# Patient Record
Sex: MALE | Race: AMERICAN INDIAN OR ALASKA NATIVE
[De-identification: names, ages, dates, MRNs, and addresses within clinical notes are randomized per-mention and may not be internally consistent; named-entity substitution may affect disease eponyms.]

---

## 2017-08-10 ENCOUNTER — HOSPITAL ENCOUNTER (INPATIENT)
Dept: HOSPITAL 5 - ED | Age: 67
LOS: 2 days | Discharge: HOME | DRG: 379 | End: 2017-08-12
Attending: INTERNAL MEDICINE | Admitting: INTERNAL MEDICINE
Payer: MEDICARE

## 2017-08-10 DIAGNOSIS — K29.70: ICD-10-CM

## 2017-08-10 DIAGNOSIS — K26.9: ICD-10-CM

## 2017-08-10 DIAGNOSIS — I10: ICD-10-CM

## 2017-08-10 DIAGNOSIS — R00.1: ICD-10-CM

## 2017-08-10 DIAGNOSIS — K22.70: ICD-10-CM

## 2017-08-10 DIAGNOSIS — K21.9: ICD-10-CM

## 2017-08-10 DIAGNOSIS — F17.200: ICD-10-CM

## 2017-08-10 DIAGNOSIS — K44.9: ICD-10-CM

## 2017-08-10 DIAGNOSIS — K62.5: Primary | ICD-10-CM

## 2017-08-10 DIAGNOSIS — K29.80: ICD-10-CM

## 2017-08-10 LAB
ALBUMIN SERPL-MCNC: 4.1 G/DL (ref 3.9–5)
ALBUMIN/GLOB SERPL: 1.4 %
ALP SERPL-CCNC: 43 UNITS/L (ref 35–129)
ALT SERPL-CCNC: 13 UNITS/L (ref 7–56)
ANION GAP SERPL CALC-SCNC: 15 MMOL/L
APTT BLD: 31 SEC. (ref 24.2–36.6)
BACTERIA #/AREA URNS HPF: (no result) /HPF
BASOPHILS NFR BLD AUTO: 0.6 % (ref 0–1.8)
BILIRUB SERPL-MCNC: 0.3 MG/DL (ref 0.1–1.2)
BILIRUB UR QL STRIP: (no result)
BLOOD UR QL VISUAL: (no result)
BUN SERPL-MCNC: 14 MG/DL (ref 9–20)
BUN/CREAT SERPL: 14 %
CALCIUM SERPL-MCNC: 9.3 MG/DL (ref 8.4–10.2)
CHLORIDE SERPL-SCNC: 103.4 MMOL/L (ref 98–107)
CO2 SERPL-SCNC: 25 MMOL/L (ref 22–30)
EOSINOPHIL NFR BLD AUTO: 3.3 % (ref 0–4.3)
GLUCOSE SERPL-MCNC: 96 MG/DL (ref 75–100)
HCT VFR BLD CALC: 39.6 % (ref 35.5–45.6)
HCT VFR BLD CALC: 41.1 % (ref 35.5–45.6)
HGB BLD-MCNC: 13 GM/DL (ref 11.8–15.2)
HGB BLD-MCNC: 13.4 GM/DL (ref 11.8–15.2)
INR PPP: 1.13 (ref 0.87–1.13)
KETONES UR STRIP-MCNC: (no result) MG/DL
LEUKOCYTE ESTERASE UR QL STRIP: (no result)
LIPASE SERPL-CCNC: 24 UNITS/L (ref 13–60)
MCH RBC QN AUTO: 28 PG (ref 28–32)
MCHC RBC AUTO-ENTMCNC: 33 % (ref 32–34)
MCV RBC AUTO: 86 FL (ref 84–94)
MUCOUS THREADS #/AREA URNS HPF: (no result) /HPF
NITRITE UR QL STRIP: (no result)
PH UR STRIP: 5 [PH] (ref 5–7)
PLATELET # BLD: 357 K/MM3 (ref 140–440)
POTASSIUM SERPL-SCNC: 4.3 MMOL/L (ref 3.6–5)
PROT SERPL-MCNC: 7.1 G/DL (ref 6.3–8.2)
PROT UR STRIP-MCNC: (no result) MG/DL
RBC # BLD AUTO: 4.78 M/MM3 (ref 3.65–5.03)
RBC #/AREA URNS HPF: 4 /HPF (ref 0–6)
SODIUM SERPL-SCNC: 139 MMOL/L (ref 137–145)
UROBILINOGEN UR-MCNC: < 2 MG/DL (ref ?–2)
WBC # BLD AUTO: 6.9 K/MM3 (ref 4.5–11)
WBC #/AREA URNS HPF: < 1 /HPF (ref 0–6)

## 2017-08-10 PROCEDURE — 93005 ELECTROCARDIOGRAM TRACING: CPT

## 2017-08-10 PROCEDURE — 36415 COLL VENOUS BLD VENIPUNCTURE: CPT

## 2017-08-10 PROCEDURE — 84439 ASSAY OF FREE THYROXINE: CPT

## 2017-08-10 PROCEDURE — 80053 COMPREHEN METABOLIC PANEL: CPT

## 2017-08-10 PROCEDURE — 85014 HEMATOCRIT: CPT

## 2017-08-10 PROCEDURE — 85018 HEMOGLOBIN: CPT

## 2017-08-10 PROCEDURE — 85025 COMPLETE CBC W/AUTO DIFF WBC: CPT

## 2017-08-10 PROCEDURE — 82962 GLUCOSE BLOOD TEST: CPT

## 2017-08-10 PROCEDURE — 74177 CT ABD & PELVIS W/CONTRAST: CPT

## 2017-08-10 PROCEDURE — 83690 ASSAY OF LIPASE: CPT

## 2017-08-10 PROCEDURE — 84443 ASSAY THYROID STIM HORMONE: CPT

## 2017-08-10 PROCEDURE — 93010 ELECTROCARDIOGRAM REPORT: CPT

## 2017-08-10 PROCEDURE — 85610 PROTHROMBIN TIME: CPT

## 2017-08-10 PROCEDURE — 85730 THROMBOPLASTIN TIME PARTIAL: CPT

## 2017-08-10 PROCEDURE — 81001 URINALYSIS AUTO W/SCOPE: CPT

## 2017-08-10 PROCEDURE — 96375 TX/PRO/DX INJ NEW DRUG ADDON: CPT

## 2017-08-10 PROCEDURE — 96376 TX/PRO/DX INJ SAME DRUG ADON: CPT

## 2017-08-10 PROCEDURE — 96374 THER/PROPH/DIAG INJ IV PUSH: CPT

## 2017-08-10 PROCEDURE — 84484 ASSAY OF TROPONIN QUANT: CPT

## 2017-08-10 PROCEDURE — C9113 INJ PANTOPRAZOLE SODIUM, VIA: HCPCS

## 2017-08-10 RX ADMIN — DEXTROSE AND SODIUM CHLORIDE SCH MLS/HR: 5; .9 INJECTION, SOLUTION INTRAVENOUS at 22:00

## 2017-08-10 NOTE — HISTORY AND PHYSICAL REPORT
History of Present Illness


Date of examination: 08/10/17


Date of admission: 


08/10/17 17:57





Chief complaint: 





BRBPR  1 day


History of present illness: 





HPI: 


This is a 66-year-old -American male presents to the emergency 

department with complaint of rectal bleeding and/or GI bleed.  The patient says 

that he is having some mild lower abdominal discomfort over the past 2 weeks.  

He recently went to see his PCP, Dr. Maria, gave him something for pain.  

However the patient woke up this morning and had a bowel movement with bright 

red blood and then another bowel movement shortly afterwards that appeared to 

be more like dark tarry stools.  He went back to see the primary care physician 

who allegedly did a sigmoid scope and saw diverticular hemorrhage.  The patient 

has a history of this in the past and has required 2 admissions in the past for 

these symptoms.  He denies any nausea, vomiting, fever but he does have some 

mild lower abdominal pain with radiation towards the back.  He denies any 

numbness or paresthesias or any neurological deficits.  No recent travel or 

sick contacts at home.  He also has a history of hypertension.





- Past Medical History


Hx Hypertension: Yes


Hx Congestive Heart Failure: No


Hx GERD: Yes


Hx Asthma: No


Hx COPD: No


Additional medical history: DIVERTICULOSIS OF LARGE INTESTINE WITH HEMORRHAGE





- Surgical History


Past Surgical History?: No





- Social History


Smoking Status: Current Every Day Smoker


Substance Use Type: Alcohol, Prescribed





Fam HX HTN





- Medications


Home Medications: 


 Home Medications











 Medication  Instructions  Recorded  Confirmed  Last Taken  Type


 


Lisinopril [Zestril TAB] 20 mg PO QDAY 09/27/15 08/10/17 08/09/17 History


 


Sildenafil Citrate [Viagra] 25 mg PO DAILY 08/10/17 08/10/17 07/27/17 History














ED Review of Systems


ROS: 


Stated complaint: ABD PAIN


Other details as noted in HPI





Comment: All other systems reviewed and negative


Constitutional: denies: chills, fever


Eyes: denies: eye pain, eye discharge, vision change


ENT: denies: ear pain, throat pain


Respiratory: denies: cough, shortness of breath, wheezing


Cardiovascular: denies: chest pain, palpitations


Gastrointestinal: abdominal pain, other (GI bleed).  denies: nausea, vomiting


Genitourinary: denies: urgency, dysuria


Musculoskeletal: denies: joint swelling, arthralgia


Skin: denies: rash, lesions


Neurological: denies: headache, weakness, paresthesias








Past History


Past Medical History: hypertension





Medications and Allergies


 Allergies











Allergy/AdvReac Type Severity Reaction Status Date / Time


 


No Known Allergies Allergy   Verified 08/10/17 09:48











 Home Medications











 Medication  Instructions  Recorded  Confirmed  Last Taken  Type


 


Lisinopril [Zestril TAB] 20 mg PO QDAY 09/27/15 08/10/17 08/09/17 History


 


Sildenafil Citrate [Viagra] 25 mg PO DAILY 08/10/17 08/10/17 07/27/17 History














Exam





- Physical Exam


Narrative exam: 





Lying comfortably





- Constitutional


Vitals: 


 











Temp Pulse Resp BP Pulse Ox


 


 97.7 F   48 L  10 L  149/67   98 


 


 08/10/17 13:19  08/10/17 18:30  08/10/17 18:30  08/10/17 18:30  08/10/17 18:30











General appearance: Present: no acute distress, well-nourished





- EENT


Eyes: Present: PERRL


ENT: hearing intact, clear oral mucosa





- Neck


Neck: Present: supple, normal ROM





- Respiratory


Respiratory effort: normal


Respiratory: bilateral: CTA





- Cardiovascular


Heart rate: 41


Rhythm: regular


Heart Sounds: Present: S1 & S2.  Absent: rub, click





- Extremities


Extremities: no ischemia, pulses intact, pulses symmetrical, No edema


Peripheral Pulses: within normal limits





- Abdominal


General gastrointestinal: Present: soft, non-tender, non-distended, normal 

bowel sounds


Male genitourinary: Present: normal





- Integumentary


Integumentary: Present: clear, warm, dry





- Musculoskeletal


Musculoskeletal: gait normal, strength equal bilaterally





- Psychiatric


Psychiatric: appropriate mood/affect, intact judgment & insight





- Neurologic


Neurologic: CNII-XII intact, moves all extremities





- Allied Health


Allied health notes reviewed: nursing, case management





Results





- Labs


CBC & Chem 7: 


 08/11/17 03:40





 08/11/17 03:40


Labs: 


 Laboratory Last Values











WBC  6.9 K/mm3 (4.5-11.0)   08/10/17  10:04    


 


RBC  4.78 M/mm3 (3.65-5.03)   08/10/17  10:04    


 


Hgb  13.4 gm/dl (11.8-15.2)   08/10/17  10:04    


 


Hct  41.1 % (35.5-45.6)   08/10/17  10:04    


 


MCV  86 fl (84-94)   08/10/17  10:04    


 


MCH  28 pg (28-32)   08/10/17  10:04    


 


MCHC  33 % (32-34)   08/10/17  10:04    


 


RDW  14.4 % (13.2-15.2)   08/10/17  10:04    


 


Plt Count  357 K/mm3 (140-440)   08/10/17  10:04    


 


Lymph % (Auto)  33.1 % (13.4-35.0)   08/10/17  10:04    


 


Mono % (Auto)  9.8 % (0.0-7.3)  H  08/10/17  10:04    


 


Eos % (Auto)  3.3 % (0.0-4.3)   08/10/17  10:04    


 


Baso % (Auto)  0.6 % (0.0-1.8)   08/10/17  10:04    


 


Lymph #  2.3 K/mm3 (1.2-5.4)   08/10/17  10:04    


 


Mono #  0.7 K/mm3 (0.0-0.8)   08/10/17  10:04    


 


Eos #  0.2 K/mm3 (0.0-0.4)   08/10/17  10:04    


 


Baso #  0.0 K/mm3 (0.0-0.1)   08/10/17  10:04    


 


Seg Neutrophils %  53.2 % (40.0-70.0)   08/10/17  10:04    


 


Seg Neutrophils #  3.6 K/mm3 (1.8-7.7)   08/10/17  10:04    


 


PT  14.4 Sec. (12.2-14.9)   08/10/17  14:39    


 


INR  1.13  (0.87-1.13)   08/10/17  14:39    


 


APTT  31.0 Sec. (24.2-36.6)   08/10/17  14:39    


 


Sodium  139 mmol/L (137-145)   08/10/17  10:04    


 


Potassium  4.3 mmol/L (3.6-5.0)   08/10/17  10:04    


 


Chloride  103.4 mmol/L ()   08/10/17  10:04    


 


Carbon Dioxide  25 mmol/L (22-30)   08/10/17  10:04    


 


Anion Gap  15 mmol/L  08/10/17  10:04    


 


BUN  14 mg/dL (9-20)   08/10/17  10:04    


 


Creatinine  1.0 mg/dL (0.8-1.5)   08/10/17  10:04    


 


Estimated GFR  > 60 ml/min  08/10/17  10:04    


 


BUN/Creatinine Ratio  14.00 %  08/10/17  10:04    


 


Glucose  96 mg/dL ()   08/10/17  10:04    


 


Calcium  9.3 mg/dL (8.4-10.2)   08/10/17  10:04    


 


Total Bilirubin  0.30 mg/dL (0.1-1.2)   08/10/17  10:04    


 


AST  13 units/L (5-40)   08/10/17  10:04    


 


ALT  13 units/L (7-56)   08/10/17  10:04    


 


Alkaline Phosphatase  43 units/L ()   08/10/17  10:04    


 


Troponin T  < 0.010 ng/mL (0.00-0.029)   08/10/17  14:39    


 


Total Protein  7.1 g/dL (6.3-8.2)   08/10/17  10:04    


 


Albumin  4.1 g/dL (3.9-5)   08/10/17  10:04    


 


Albumin/Globulin Ratio  1.4 %  08/10/17  10:04    


 


Lipase  24 units/L (13-60)   08/10/17  10:04    


 


Urine Color  Straw  (Yellow)   08/10/17  10:12    


 


Urine Turbidity  Clear  (Clear)   08/10/17  10:12    


 


Urine pH  5.0  (5.0-7.0)   08/10/17  10:12    


 


Ur Specific Gravity  1.011  (1.003-1.030)   08/10/17  10:12    


 


Urine Protein  <15 mg/dl mg/dL (Negative)   08/10/17  10:12    


 


Urine Glucose (UA)  Neg mg/dL (Negative)   08/10/17  10:12    


 


Urine Ketones  Neg mg/dL (Negative)   08/10/17  10:12    


 


Urine Blood  Neg  (Negative)   08/10/17  10:12    


 


Urine Nitrite  Neg  (Negative)   08/10/17  10:12    


 


Urine Bilirubin  Neg  (Negative)   08/10/17  10:12    


 


Urine Urobilinogen  < 2.0 mg/dL (<2.0)   08/10/17  10:12    


 


Ur Leukocyte Esterase  Neg  (Negative)   08/10/17  10:12    


 


Urine WBC (Auto)  < 1.0 /HPF (0.0-6.0)   08/10/17  10:12    


 


Urine RBC (Auto)  4.0 /HPF (0.0-6.0)   08/10/17  10:12    


 


U Epithel Cells (Auto)  < 1.0 /HPF (0-13.0)   08/10/17  10:12    


 


Urine Bacteria (Auto)  1+ /HPF (Negative)   08/10/17  10:12    


 


Urine Mucus  Few /HPF  08/10/17  10:12    








 Short CBC











  08/10/17 08/10/17 08/11/17 Range/Units





  10:04 22:07 03:40 


 


WBC  6.9   9.5  (4.5-11.0)  K/mm3


 


Hgb  13.4  13.0  12.9  (11.8-15.2)  gm/dl


 


Hct  41.1  39.6  39.3  (35.5-45.6)  %


 


Plt Count  357   357  (140-440)  K/mm3








 BMP











  08/10/17 08/11/17





  10:04 03:40


 


Sodium  139  140


 


Potassium  4.3  3.8


 


Chloride  103.4  102.5


 


Carbon Dioxide  25  22


 


BUN  14  12


 


Creatinine  1.0  1.0


 


Glucose  96  104 H


 


Calcium  9.3  9.0








 Cardiac Enzymes











  08/10/17 Range/Units





  14:39 


 


Troponin T  < 0.010  (0.00-0.029)  ng/mL








 Liver Function











  08/10/17 08/11/17 Range/Units





  10:04 03:40 


 


Total Bilirubin  0.30  0.50  (0.1-1.2)  mg/dL


 


AST  13  12  (5-40)  units/L


 


ALT  13  12  (7-56)  units/L


 


Alkaline Phosphatase  43  39  ()  units/L


 


Albumin  4.1  3.9  (3.9-5)  g/dL








 Urine











  08/10/17 Range/Units





  10:12 


 


Urine Color  Straw  (Yellow)  


 


Urine pH  5.0  (5.0-7.0)  


 


Ur Specific Gravity  1.011  (1.003-1.030)  


 


Urine Protein  <15 mg/dl  (Negative)  mg/dL


 


Urine Glucose (UA)  Neg  (Negative)  mg/dL














- Imaging and Cardiology


EKG: report reviewed (41/min Marked sinus Bradycardia)





Assessment and Plan


Advance Directives: Yes (Full code)


VTE prophylaxis?: Chemical


Plan of care discussed with patient/family: Yes





- Patient Problems


(1) Lower GI bleed


Current Visit: Yes   Status: Acute   


Plan to address problem: 


Probably Diverticular.Had similar episode s once in 2015 and 2016 each.Doesn't 

remember the diagnosis.


GI consult requested.


Transfuse PRBC if necessary.








(2) Bradycardia


Current Visit: Yes   Status: Chronic   


Plan to address problem: 


Asymptomatic


Probably vasovagal.Will consult cardiology if persistent and symptomatic








(3) Hypertension


Current Visit: Yes   Status: Chronic   


Qualifiers: 


   Hypertension type: essential hypertension   Qualified Code(s): I10 - 

Essential (primary) hypertension   


Plan to address problem: 


Patient initiated on Catapress patch








(4) GERD (gastroesophageal reflux disease)


Current Visit: Yes   Status: Acute   


Qualifiers: 


   Esophagitis presence: without esophagitis   Qualified Code(s): K21.9 - Gastro

-esophageal reflux disease without esophagitis   


Plan to address problem: 


IV Famotidine for now








(5) Nicotine dependence


Current Visit: Yes   Status: Chronic   


Qualifiers: 


   Nicotine product type: cigarettes   Substance use status: S 


Plan to address problem: 


Nicoderm patch initiated








(6) DVT prophylaxis


Current Visit: No   Status: Acute   


Plan to address problem: 


Only SCD's

## 2017-08-10 NOTE — EMERGENCY DEPARTMENT REPORT
HPI





- General


Chief Complaint: GI Bleed


Time Seen by Provider: 08/10/17 10:44





- HPI


HPI: 


This is a 66-year-old -American male presents to the emergency 

department with complaint of rectal bleeding and/or GI bleed.  The patient says 

that he is having some mild lower abdominal discomfort over the past 2 weeks.  

He recently went to see his PCP, Dr. Maria, gave him something for pain.  

However the patient woke up this morning and had a bowel movement with bright 

red blood and then another bowel movement shortly afterwards that appeared to 

be more like dark tarry stools.  He went back to see the primary care physician 

who allegedly did a sigmoid scope and saw diverticular hemorrhage.  The patient 

has a history of this in the past and has required 2 admissions in the past for 

these symptoms.  He denies any nausea, vomiting, fever but he does have some 

mild lower abdominal pain with radiation towards the back.  He denies any 

numbness or paresthesias or any neurological deficits.  No recent travel or 

sick contacts at home.  He also has a history of hypertension.








ED Past Medical Hx





- Past Medical History


Hx Hypertension: Yes


Hx Congestive Heart Failure: No


Hx GERD: Yes


Hx Asthma: No


Hx COPD: No


Additional medical history: DIVERTICULOSIS OF LARGE INTESTINE WITH HEMORRHAGE





- Surgical History


Past Surgical History?: No





- Social History


Smoking Status: Current Every Day Smoker


Substance Use Type: Alcohol, Prescribed





- Medications


Home Medications: 


 Home Medications











 Medication  Instructions  Recorded  Confirmed  Last Taken  Type


 


Lisinopril [Zestril TAB] 20 mg PO QDAY 09/27/15 08/10/17 08/09/17 History


 


Sildenafil Citrate [Viagra] 25 mg PO DAILY 08/10/17 08/10/17 07/27/17 History














ED Review of Systems


ROS: 


Stated complaint: ABD PAIN


Other details as noted in HPI





Comment: All other systems reviewed and negative


Constitutional: denies: chills, fever


Eyes: denies: eye pain, eye discharge, vision change


ENT: denies: ear pain, throat pain


Respiratory: denies: cough, shortness of breath, wheezing


Cardiovascular: denies: chest pain, palpitations


Gastrointestinal: abdominal pain, other (GI bleed).  denies: nausea, vomiting


Genitourinary: denies: urgency, dysuria


Musculoskeletal: denies: joint swelling, arthralgia


Skin: denies: rash, lesions


Neurological: denies: headache, weakness, paresthesias





Physical Exam





- Physical Exam


Vital Signs: 


 Vital Signs











  08/10/17





  09:41


 


Temperature 97.3 F L


 


Pulse Rate 51 L


 


Respiratory 18





Rate 


 


Blood Pressure 162/96


 


O2 Sat by Pulse 97





Oximetry 











Physical Exam: 


GENERAL: The patient is well-developed well-nourished.


HEENT: Normocephalic.  Atraumatic.  Extraocular motions are intact.  Patient 

has moist mucous membranes.  Pupils equal reactive to light bilaterally.


NECK: Supple.  Trachea is midline.


CHEST/LUNGS: Clear to auscultation.  There is no respiratory distress noted.


HEART/CARDIOVASCULAR: Regular.  There is mild bradycardia.  There is no gallop 

rub or murmur.


ABDOMEN: Abdomen is soft, nontender.  Patient has normal bowel sounds.  There 

is no abdominal distention.


SKIN: Skin is warm and dry.


NEURO: The patient is awake, alert, and oriented.  The patient is cooperative.  

The patient has no focal neurologic deficits.  The patient has normal speech.


MUSCULOSKELETAL: There is no tenderness or deformity.  There is no limitation 

range of motion.  There is no evidence of acute injury.


Rectal: Deferred








ED Course


 Vital Signs











  08/10/17





  09:41


 


Temperature 97.3 F L


 


Pulse Rate 51 L


 


Respiratory 18





Rate 


 


Blood Pressure 162/96


 


O2 Sat by Pulse 97





Oximetry 














ED Medical Decision Making





- Lab Data


Result diagrams: 


 08/10/17 10:04





 08/10/17 10:04





- EKG Data


-: EKG Interpreted by Me


EKG shows normal: sinus rhythm, axis, intervals, QRS complexes, ST-T waves


Rate: bradycardia (41 bpm)





- EKG Data


When compared to previous EKG there are: previous EKG unavailable


Interpretation: other (marked sinus bradycardia)





- Radiology Data


Radiology results: report reviewed


CT of the abdomen and pelvis with IV contrast was read by radiology as 

essentially a normal examination.








- Medical Decision Making





66-year-old male presents emergency Department with a few weeks of some 

abdominal discomfort but really came in today because of rectal bleeding that 

appeared to be a diverticular hemorrhage at his PCPs office.  Most the labs 

unremarkable.  CT does not show any signs of diverticulitis, malignancy or any 

other acute process.  Patient's only does not appear to need any type of 

transfusion at this time.  However while the patient is in the emergency 

department he has had significant bradycardia going down into the 30s, all 

while awake, and is not on any type of beta blocker or calcium channel blocker 

or anything for rate control.  This reason the patient will be admitted to the 

hospital for further evaluation and has been accepted for admission by the 

hospitalist, Dr. Bustillos.





- Differential Diagnosis


diverticulosis, diverticulitis, malignancy, colitis


Critical Care Time: No


Critical care attestation.: 


If time is entered above; I have spent that time in minutes in the direct care 

of this critically ill patient, excluding procedure time.








ED Disposition


Clinical Impression: 


 Rectal bleeding, Bradycardia





Hypertension


Qualifiers:


 Hypertension type: essential hypertension Qualified Code(s): I10 - Essential (

primary) hypertension





Disposition: DC-09 OP ADMIT IP TO THIS HOSP


Is pt being admited?: Yes


Does the pt Need Aspirin: No


Condition: Stable


Instructions:  Hypertension (ED)


Referrals: 


PRIMARY CARE,MD [Primary Care Provider] - 3-5 Days


Forms:  Accompanied Note


Time of Disposition: 15:32

## 2017-08-10 NOTE — CAT SCAN REPORT
CT scan of abdomen and pelvis with IV contrast:



History: Abdominal pain.



Findings:



Normal lung bases. No pleural or pericardial effusion.



Normal liver spleen pancreas and gallbladder.



Normal adrenals kidney parenchyma and bladder. No free intraperitoneal 

fluid or air. Prostate measures 3.3 x 4.1 cm.



Gaseous colon with small volume stool in colon. No evidence of 

appendicitis or diverticulitis.



Impression:



Essentially negative CT scan of abdomen and CT scan of pelvis.

## 2017-08-11 LAB
ALBUMIN SERPL-MCNC: 3.9 G/DL (ref 3.9–5)
ALBUMIN/GLOB SERPL: 1.4 %
ALP SERPL-CCNC: 39 UNITS/L (ref 35–129)
ALT SERPL-CCNC: 12 UNITS/L (ref 7–56)
ANION GAP SERPL CALC-SCNC: 19 MMOL/L
BASOPHILS NFR BLD AUTO: 0.6 % (ref 0–1.8)
BILIRUB SERPL-MCNC: 0.5 MG/DL (ref 0.1–1.2)
BUN SERPL-MCNC: 12 MG/DL (ref 9–20)
BUN/CREAT SERPL: 12 %
CALCIUM SERPL-MCNC: 9 MG/DL (ref 8.4–10.2)
CHLORIDE SERPL-SCNC: 102.5 MMOL/L (ref 98–107)
CO2 SERPL-SCNC: 22 MMOL/L (ref 22–30)
EOSINOPHIL NFR BLD AUTO: 2.5 % (ref 0–4.3)
GLUCOSE SERPL-MCNC: 104 MG/DL (ref 75–100)
HCT VFR BLD CALC: 39.3 % (ref 35.5–45.6)
HCT VFR BLD CALC: 39.7 % (ref 35.5–45.6)
HCT VFR BLD CALC: 40.2 % (ref 35.5–45.6)
HGB BLD-MCNC: 12.9 GM/DL (ref 11.8–15.2)
HGB BLD-MCNC: 13 GM/DL (ref 11.8–15.2)
HGB BLD-MCNC: 13.2 GM/DL (ref 11.8–15.2)
MCH RBC QN AUTO: 28 PG (ref 28–32)
MCHC RBC AUTO-ENTMCNC: 33 % (ref 32–34)
MCV RBC AUTO: 85 FL (ref 84–94)
PLATELET # BLD: 357 K/MM3 (ref 140–440)
POTASSIUM SERPL-SCNC: 3.8 MMOL/L (ref 3.6–5)
PROT SERPL-MCNC: 6.6 G/DL (ref 6.3–8.2)
RBC # BLD AUTO: 4.63 M/MM3 (ref 3.65–5.03)
SODIUM SERPL-SCNC: 140 MMOL/L (ref 137–145)
WBC # BLD AUTO: 9.5 K/MM3 (ref 4.5–11)

## 2017-08-11 PROCEDURE — 0DJ08ZZ INSPECTION OF UPPER INTESTINAL TRACT, VIA NATURAL OR ARTIFICIAL OPENING ENDOSCOPIC: ICD-10-PCS | Performed by: INTERNAL MEDICINE

## 2017-08-11 RX ADMIN — HYDRALAZINE HYDROCHLORIDE SCH MG: 25 TABLET, FILM COATED ORAL at 23:04

## 2017-08-11 RX ADMIN — NICOTINE SCH MG: 21 PATCH TRANSDERMAL at 09:57

## 2017-08-11 RX ADMIN — DEXTROSE AND SODIUM CHLORIDE SCH MLS/HR: 5; .9 INJECTION, SOLUTION INTRAVENOUS at 11:14

## 2017-08-11 NOTE — ADMIT CRITERIA FORM
Admission Criteria Documentation: 





                GASTROINTESTINAL BLEEDING, LOWER





Clinical Indications for Admission to Inpatient Care





                                                 ([place]/check or initial the 

applicable condition/criteria)





I. Active gross bleeding per rectum. 


II. Failure to control bleeding after colonoscopy 


III. Unstable comorbid illness (eg, hepatic, pulmonary,or cardiac) 


IV. Coagulopathy(eg, advanced liver disease, irreversible anticoagulation) 


V. Suspected or known ischemic colitis(6)


VI. Previous aortic graft placement or known aortic aneurysm 


[X]VII. Inpatient admission required rather than observation care (Also use 

Gastrointestinal Bleeding,Lower:


      Observation Care as appropriate) because of 1 or more of the following(7)(

8):


       a) Hemodynamic instability 


       b) Anemia requiring inpatient admission as indicated by ALL of the 

followin) Presence of significant clinical finding indicated by 1 or more 

of the following: 


               A. Tachycardia for age 


               B. Orthostatic vital sign changes 


               C. Altered mental status 


               D. Heart failure 


               E. Chest pain 


               F. Exertional dyspnea 


               G. Other findings suggesting inadequate perfusion (e.g., 

peripheral or myocardial                                                       

       


                    ischemia, end organ dysfunction) 


         2) Initial (e.g., emergency department, observation care) treatment 

with transfusion or volume


             replacement is judged inappropriate (due to severity of the finding

) or has been ineffective 


      c) Severe pain requiring acute inpatient management 


      d) Altered mental status that is severe or persistent 


      e) Absent bowel sounds with complete ileus


      f) Signs of intestinal obstruction or peritonitis [A]


      g) High-risklow platelet count 


      h) Severe electrolyte abnormalities requiring inpatient care 


      i) Acute renal failure 


      j) High fever or infection requiring inpatient admission as indicated by 

1 or more of the following (10)(11): 


        1) Appropriate outpatient or observation care antimicrobial treatment 

unavailable, not effective, or not feasible 


        2) Documented bacteremia 


        3) Temperature greater than 104.9 degrees F (40.5 degrees C) (oral) 


        4) Temperature greater than 103.1 degrees F (39.5 degrees C) (oral) or 

less than 96.8 degrees F 


            (36 degrees C) (rectal) that does not respond to all emergency 

treatment measures 


     k) Immediate inpatient surgeryneeded 


     l) Parenteral nutrition regimen that must be implemented on inpatient 

basis 


   [X]  m) Other condition, treatment or monitoring requiring inpatient 

admission





Extended stay beyond goal length of stay may be needed for(3)(4)(24):


a) Emergency surgery(25) 


b) Coagulation abnormalities(26) 


c) Recurrent or persistent bleeding, continued vital sign instability(20)(25)(27

)(28) 


d) Active comorbidities (eg, renal insufficiency, heart failure, pre-

existingliver disease)(22)








The original [facility] [employer] Feed content created by [facility] has been revised. 


The portions of the  content which have been revised are identified through the 

use of italic text or in bold, and [facility] 


has neither reviewed  nor  approved the modified material. All other unmodified 

content is copyright  [facility].





Please see references footnoted in the original [facility] edition 

2017





Admission Criteria Met: Yes

## 2017-08-11 NOTE — PROGRESS NOTE
History


Interval history: 





Hematochezia.  Etiology is likely secondary to diverticular bleed.  Continue to 

follow H&H.  Await GI recommendations.  If patient rebleeds, we'll consider RBC 

scan versus CTA.





Bradycardia.  Radiology consultation pending.





Hypertension.  Resume antihypertensive medications.





GERD.  Continue Pepcid.





DVT prophylaxis.  Continue SCDs.  No anticoagulation given hematochezia.





Hospitalist Physical





- Constitutional


Vitals: 


 











Temp Pulse Resp BP Pulse Ox


 


 98 F   46 L  18   151/72   97 


 


 08/11/17 10:00  08/11/17 10:00  08/11/17 10:00  08/11/17 10:00  08/11/17 10:00











General appearance: Present: no acute distress, well-nourished





Results





- Labs


CBC & Chem 7: 


 08/11/17 09:00





 08/11/17 03:40


Labs: 


 Laboratory Last Values











WBC  9.5 K/mm3 (4.5-11.0)   08/11/17  03:40    


 


RBC  4.63 M/mm3 (3.65-5.03)   08/11/17  03:40    


 


Hgb  13.0 gm/dl (11.8-15.2)   08/11/17  09:00    


 


Hct  40.2 % (35.5-45.6)   08/11/17  09:00    


 


MCV  85 fl (84-94)   08/11/17  03:40    


 


MCH  28 pg (28-32)   08/11/17  03:40    


 


MCHC  33 % (32-34)   08/11/17  03:40    


 


RDW  14.6 % (13.2-15.2)   08/11/17  03:40    


 


Plt Count  357 K/mm3 (140-440)   08/11/17  03:40    


 


Lymph % (Auto)  24.8 % (13.4-35.0)   08/11/17  03:40    


 


Mono % (Auto)  8.6 % (0.0-7.3)  H  08/11/17  03:40    


 


Eos % (Auto)  2.5 % (0.0-4.3)   08/11/17  03:40    


 


Baso % (Auto)  0.6 % (0.0-1.8)   08/11/17  03:40    


 


Lymph #  2.4 K/mm3 (1.2-5.4)   08/11/17  03:40    


 


Mono #  0.8 K/mm3 (0.0-0.8)   08/11/17  03:40    


 


Eos #  0.2 K/mm3 (0.0-0.4)   08/11/17  03:40    


 


Baso #  0.1 K/mm3 (0.0-0.1)   08/11/17  03:40    


 


Seg Neutrophils %  63.5 % (40.0-70.0)   08/11/17  03:40    


 


Seg Neutrophils #  6.1 K/mm3 (1.8-7.7)   08/11/17  03:40    


 


PT  14.4 Sec. (12.2-14.9)   08/10/17  14:39    


 


INR  1.13  (0.87-1.13)   08/10/17  14:39    


 


APTT  31.0 Sec. (24.2-36.6)   08/10/17  14:39    


 


Sodium  140 mmol/L (137-145)   08/11/17  03:40    


 


Potassium  3.8 mmol/L (3.6-5.0)   08/11/17  03:40    


 


Chloride  102.5 mmol/L ()   08/11/17  03:40    


 


Carbon Dioxide  22 mmol/L (22-30)   08/11/17  03:40    


 


Anion Gap  19 mmol/L  08/11/17  03:40    


 


BUN  12 mg/dL (9-20)   08/11/17  03:40    


 


Creatinine  1.0 mg/dL (0.8-1.5)   08/11/17  03:40    


 


Estimated GFR  > 60 ml/min  08/11/17  03:40    


 


BUN/Creatinine Ratio  12.00 %  08/11/17  03:40    


 


Glucose  104 mg/dL ()  H  08/11/17  03:40    


 


POC Glucose  72  ()   08/10/17  21:54    


 


Calcium  9.0 mg/dL (8.4-10.2)   08/11/17  03:40    


 


Total Bilirubin  0.50 mg/dL (0.1-1.2)   08/11/17  03:40    


 


AST  12 units/L (5-40)   08/11/17  03:40    


 


ALT  12 units/L (7-56)   08/11/17  03:40    


 


Alkaline Phosphatase  39 units/L ()   08/11/17  03:40    


 


Troponin T  < 0.010 ng/mL (0.00-0.029)   08/10/17  14:39    


 


Total Protein  6.6 g/dL (6.3-8.2)   08/11/17  03:40    


 


Albumin  3.9 g/dL (3.9-5)   08/11/17  03:40    


 


Albumin/Globulin Ratio  1.4 %  08/11/17  03:40    


 


Lipase  24 units/L (13-60)   08/10/17  10:04    


 


Urine Color  Straw  (Yellow)   08/10/17  10:12    


 


Urine Turbidity  Clear  (Clear)   08/10/17  10:12    


 


Urine pH  5.0  (5.0-7.0)   08/10/17  10:12    


 


Ur Specific Gravity  1.011  (1.003-1.030)   08/10/17  10:12    


 


Urine Protein  <15 mg/dl mg/dL (Negative)   08/10/17  10:12    


 


Urine Glucose (UA)  Neg mg/dL (Negative)   08/10/17  10:12    


 


Urine Ketones  Neg mg/dL (Negative)   08/10/17  10:12    


 


Urine Blood  Neg  (Negative)   08/10/17  10:12    


 


Urine Nitrite  Neg  (Negative)   08/10/17  10:12    


 


Urine Bilirubin  Neg  (Negative)   08/10/17  10:12    


 


Urine Urobilinogen  < 2.0 mg/dL (<2.0)   08/10/17  10:12    


 


Ur Leukocyte Esterase  Neg  (Negative)   08/10/17  10:12    


 


Urine WBC (Auto)  < 1.0 /HPF (0.0-6.0)   08/10/17  10:12    


 


Urine RBC (Auto)  4.0 /HPF (0.0-6.0)   08/10/17  10:12    


 


U Epithel Cells (Auto)  < 1.0 /HPF (0-13.0)   08/10/17  10:12    


 


Urine Bacteria (Auto)  1+ /HPF (Negative)   08/10/17  10:12    


 


Urine Mucus  Few /HPF  08/10/17  10:12

## 2017-08-11 NOTE — CONSULTATION
History of Present Illness


Consult date: 08/11/17


Requesting physician: JEMAL LEMUS


Consult reason: bradycardia


History of present illness: 


The pt is a 67 YO male with a past medical history significant for HTN, 

recurrent lower GI bleeding, tobacco use and bradycardia (noted since 2015). He 

is previously unknown to our practice. He presented with c/o rectal bleeding a 

dark stools since yesterday AM. He was noted to have sinus bradycardia 

following admission and thus cardiology has been consulted. On evaluation, pt 

has no complaints. On evaluation, he denies any history of chest pain, 

palpitations, n/v, diaphoresis, dizziness, or syncope. He does admit to 

occasional dizziness over the past several years if he has a "coughing spell". 








Past History


Past Medical History: hypertension


Past Surgical History: Other (EGD/Colonosocoopy in 2015)


Social history: smoking


Family history: no significant family history





Medications and Allergies


 Allergies











Allergy/AdvReac Type Severity Reaction Status Date / Time


 


No Known Allergies Allergy   Verified 08/10/17 09:48











 Home Medications











 Medication  Instructions  Recorded  Confirmed  Last Taken  Type


 


Lisinopril [Zestril TAB] 20 mg PO QDAY 09/27/15 08/10/17 08/09/17 History


 


Sildenafil Citrate [Viagra] 25 mg PO DAILY 08/10/17 08/10/17 07/27/17 History











Active Meds: 


Active Medications





Acetaminophen (Tylenol)  650 mg PO Q4H PRN


   PRN Reason: Pain MILD(1-3)/Fever >100.5/HA


Bisacodyl (Dulcolax)  10 mg RI QDAY PRN


   PRN Reason: Constipation unrelieved by MOM


Clonidine HCl (Catapres-Tts Patch)  0.2 mg TD Fr CHRISTINE


Hydromorphone HCl (Dilaudid)  0.5 mg IV Q3H PRN


   PRN Reason: Pain , Severe (7-10)


Dextrose/Sodium Chloride (D5ns)  1,000 mls @ 75 mls/hr IV AS DIRECT CHRISTINE


   Last Admin: 08/11/17 11:14 Dose:  75 mls/hr


Pantoprazole Sodium 80 mg/ (Sodium Chloride)  100 mls @ 10 mls/hr IV AS DIRECT 

CHRISTINE


   PRN Reason: 8 MG/HR


   Last Admin: 08/10/17 22:30 Dose:  8 mg/hr, 10 mls/hr


Magnesium Hydroxide (Milk Of Magnesia)  30 ml PO Q4H PRN


   PRN Reason: Constipation


Metoclopramide HCl (Reglan)  10 mg IV Q6H PRN


   PRN Reason: Nausea And Vomiting


Nicotine (Habitrol)  21 mg TD QDAY CHRISTINE


   Last Admin: 08/11/17 09:57 Dose:  21 mg


Ondansetron HCl (Zofran)  4 mg IV Q8H PRN


   PRN Reason: N/V unrelieved by Reglan


Promethazine HCl (Phenergan)  25 mg RI Q6H PRN


   PRN Reason: N/V IF NPO AND NO IV ACCESS











Review of Systems


All systems: negative


Cardiovascular: no chest pain, no syncope, no lightheadedness, no shortness of 

breath, no dyspnea on exertion


Gastrointestinal: melena, hematochezia, no abdominal pain, no nausea, no 

vomiting, no diarrhea, no constipation





Physical Examination


 Vital Signs











Temp Pulse Resp BP Pulse Ox


 


 97.3 F L  51 L  18   162/96   97 


 


 08/10/17 09:41  08/10/17 09:41  08/10/17 09:41  08/10/17 09:41  08/10/17 09:41











General appearance: no acute distress


HEENT: Positive: PERRL, Normocephaly, Mucus Membranes Moist


Neck: Positive: neck supple, trachea midline


Cardiac: Positive: S1/S2, Bradycardia


Lungs: Positive: Normal Exam, clear to auscultation, Normal Breath Sounds


Neuro: Positive: Grossly Intact, Cranial Nerve 2-12 Intact


Abdomen: Positive: Soft, Active Bowel Sounds.  Negative: Tender


Skin: Positive: Clear.  Negative: Rash, Wound


Musculoskeletal: No Fluid Collection, No Pain, Normal Range of Motion





Results





 08/11/17 15:47





 08/11/17 03:40


 Cardiac Enzymes











  08/11/17 Range/Units





  03:40 


 


AST  12  (5-40)  units/L








 CBC











  08/10/17 08/11/17 08/11/17 Range/Units





  22:07 03:40 09:00 


 


WBC   9.5   (4.5-11.0)  K/mm3


 


RBC   4.63   (3.65-5.03)  M/mm3


 


Hgb  13.0  12.9  13.0  (11.8-15.2)  gm/dl


 


Hct  39.6  39.3  40.2  (35.5-45.6)  %


 


Plt Count   357   (140-440)  K/mm3


 


Lymph #   2.4   (1.2-5.4)  K/mm3


 


Mono #   0.8   (0.0-0.8)  K/mm3


 


Eos #   0.2   (0.0-0.4)  K/mm3


 


Baso #   0.1   (0.0-0.1)  K/mm3














  08/11/17 Range/Units





  15:47 


 


WBC   (4.5-11.0)  K/mm3


 


RBC   (3.65-5.03)  M/mm3


 


Hgb  13.2  (11.8-15.2)  gm/dl


 


Hct  39.7  (35.5-45.6)  %


 


Plt Count   (140-440)  K/mm3


 


Lymph #   (1.2-5.4)  K/mm3


 


Mono #   (0.0-0.8)  K/mm3


 


Eos #   (0.0-0.4)  K/mm3


 


Baso #   (0.0-0.1)  K/mm3








 Comprehensive Metabolic Panel











  08/11/17 Range/Units





  03:40 


 


Sodium  140  (137-145)  mmol/L


 


Potassium  3.8  (3.6-5.0)  mmol/L


 


Chloride  102.5  ()  mmol/L


 


Carbon Dioxide  22  (22-30)  mmol/L


 


BUN  12  (9-20)  mg/dL


 


Creatinine  1.0  (0.8-1.5)  mg/dL


 


Glucose  104 H  ()  mg/dL


 


Calcium  9.0  (8.4-10.2)  mg/dL


 


AST  12  (5-40)  units/L


 


ALT  12  (7-56)  units/L


 


Alkaline Phosphatase  39  ()  units/L


 


Total Protein  6.6  (6.3-8.2)  g/dL


 


Albumin  3.9  (3.9-5)  g/dL














- Imaging and Cardiology


EKG: image reviewed





EKG interpretations





- Telemetry


EKG Rhythm: Sinus Bradycardia





- EKG


Sinus rhythms and dysrhythmias: sinus bradycardia





Assessment and Plan


Obtain thyroid panel. 


Optimize anti-hypertensive regimen - d/c clonidine as this may be contributing 

to bradycardia and initiate hydralazine. D/c lisinopril. 


If thyroid panel WNL, pt may discharge home from cardiology standpoint and will 

plan for echo as OP. 


Recommend pt to follow up in our office with Dr. Nguyễn within 1 week of 

hospital discharge ([phone]). 





The patient has been seen in conjunction with Dr. Nguyễn who agrees with the 

assessment and plan of care.








- Patient Problems


(1) Sinus bradycardia


Current Visit: Yes   Status: Chronic   





(2) Lower GI bleed


Current Visit: Yes   Status: Acute   





(3) GERD (gastroesophageal reflux disease)


Current Visit: Yes   Status: Chronic   


Qualifiers: 


   Esophagitis presence: without esophagitis   Qualified Code(s): K21.9 - Gastro

-esophageal reflux disease without esophagitis   





(4) Hypertension


Current Visit: Yes   Status: Chronic   


Qualifiers: 


   Hypertension type: essential hypertension   Qualified Code(s): I10 - 

Essential (primary) hypertension   





(5) Tobacco use


Current Visit: Yes   Status: Chronic

## 2017-08-11 NOTE — GASTROENTEROLOGY CONSULTATION
History of Present Illness





- Reason for Consult


Consult date: 08/11/17


GI bleeding


Requesting physician: ORQUIDEA MYERS





- History of Present Illness


Mr. Huizar is a 65 y/o male admitted with a one day hx of hematochezia with noted 

dark maroon stool as well.  He was seen by his PCP and sent to the ED.  H/H 

remains stable and the patient has had no further bleeding since yesterday.  CT 

of A/P negative.  He has a hx in 2015/2016 of suspected diverticular bleeding.  

He underwent EGD and colonoscopy in 2015 iwth normal EGD and fresh blood in the 

TI, suspected Diverticular bleeding.  The patient was recommended to have a 

pill cam at that time. He denies abdominal pain but reports right flank pain. 

No fever or chills. He was also found to have bradycardia on admission. 








Past History


Past Medical History: hypertension


Past Surgical History: Other (EGD/Colonosocoopy in 2015)


Social history: smoking


Family history: no significant family history





Medications and Allergies


 Allergies











Allergy/AdvReac Type Severity Reaction Status Date / Time


 


No Known Allergies Allergy   Verified 08/10/17 09:48











 Home Medications











 Medication  Instructions  Recorded  Confirmed  Last Taken  Type


 


Lisinopril [Zestril TAB] 20 mg PO QDAY 09/27/15 08/10/17 08/09/17 History


 


Sildenafil Citrate [Viagra] 25 mg PO DAILY 08/10/17 08/10/17 07/27/17 History











Active Meds: 


Active Medications





Acetaminophen (Tylenol)  650 mg PO Q4H PRN


   PRN Reason: Pain MILD(1-3)/Fever >100.5/HA


Bisacodyl (Dulcolax)  10 mg IL QDAY PRN


   PRN Reason: Constipation unrelieved by MOM


Clonidine HCl (Catapres-Tts Patch)  0.2 mg TD Fr CHRISTINE


Hydromorphone HCl (Dilaudid)  0.5 mg IV Q3H PRN


   PRN Reason: Pain , Severe (7-10)


Dextrose/Sodium Chloride (D5ns)  1,000 mls @ 75 mls/hr IV AS DIRECT CHRISTINE


   Last Admin: 08/11/17 11:14 Dose:  75 mls/hr


Pantoprazole Sodium 80 mg/ (Sodium Chloride)  100 mls @ 10 mls/hr IV AS DIRECT 

CHRISTINE


   PRN Reason: 8 MG/HR


   Last Admin: 08/10/17 22:30 Dose:  8 mg/hr, 10 mls/hr


Magnesium Hydroxide (Milk Of Magnesia)  30 ml PO Q4H PRN


   PRN Reason: Constipation


Metoclopramide HCl (Reglan)  10 mg IV Q6H PRN


   PRN Reason: Nausea And Vomiting


Nicotine (Habitrol)  21 mg TD QDAY CHRISTINE


   Last Admin: 08/11/17 09:57 Dose:  21 mg


Ondansetron HCl (Zofran)  4 mg IV Q8H PRN


   PRN Reason: N/V unrelieved by Reglan


Promethazine HCl (Phenergan)  25 mg IL Q6H PRN


   PRN Reason: N/V IF NPO AND NO IV ACCESS


Sodium Biphosphate/Sodium Phosphate (Fleet)  266 ml IL ONCE NR


   Stop: 08/11/17 14:00











Review of Systems





- Review of Systems


All systems: negative


Gastrointestinal: BRBPR, hematochezia





Exam





- Constitutional


Vital Signs: 


 











Temp Pulse Resp BP Pulse Ox


 


 98 F   46 L  18   151/72   97 


 


 08/11/17 10:00  08/11/17 10:00  08/11/17 10:00  08/11/17 10:00  08/11/17 10:00











General appearance: no acute distress





- EENT


Eyes: EOM intact


ENT: hearing intact





- Neck


Neck: supple





- Respiratory


Respiratory: bilateral: CTA





- Cardiovascular


Rhythm: regular


Heart Sounds: Present: S1 & S2


Extremities: pulses intact





- Gastrointestinal


General gastrointestinal: Present: soft, non-tender, normal bowel sounds





- Integumentary


Integumentary: Present: clear, warm





- Neurologic


Neurological: alert and oriented x3





- Psychiatric


Psychiatric: appropriate mood/affect, cooperative





- Labs


CBC & Chem 7: 


 08/11/17 09:00





 08/11/17 03:40


Lab Results: 


 Laboratory Results - last 24 hr











  08/10/17 08/10/17 08/11/17





  21:54 22:07 03:40


 


WBC    9.5


 


RBC    4.63


 


Hgb   13.0  12.9


 


Hct   39.6  39.3


 


MCV    85


 


MCH    28


 


MCHC    33


 


RDW    14.6


 


Plt Count    357


 


Lymph % (Auto)    24.8


 


Mono % (Auto)    8.6 H


 


Eos % (Auto)    2.5


 


Baso % (Auto)    0.6


 


Lymph #    2.4


 


Mono #    0.8


 


Eos #    0.2


 


Baso #    0.1


 


Seg Neutrophils %    63.5


 


Seg Neutrophils #    6.1


 


Sodium   


 


Potassium   


 


Chloride   


 


Carbon Dioxide   


 


Anion Gap   


 


BUN   


 


Creatinine   


 


Estimated GFR   


 


BUN/Creatinine Ratio   


 


Glucose   


 


POC Glucose  72  


 


Calcium   


 


Total Bilirubin   


 


AST   


 


ALT   


 


Alkaline Phosphatase   


 


Total Protein   


 


Albumin   


 


Albumin/Globulin Ratio   














  08/11/17 08/11/17





  03:40 09:00


 


WBC  


 


RBC  


 


Hgb   13.0


 


Hct   40.2


 


MCV  


 


MCH  


 


MCHC  


 


RDW  


 


Plt Count  


 


Lymph % (Auto)  


 


Mono % (Auto)  


 


Eos % (Auto)  


 


Baso % (Auto)  


 


Lymph #  


 


Mono #  


 


Eos #  


 


Baso #  


 


Seg Neutrophils %  


 


Seg Neutrophils #  


 


Sodium  140 


 


Potassium  3.8 


 


Chloride  102.5 


 


Carbon Dioxide  22 


 


Anion Gap  19 


 


BUN  12 


 


Creatinine  1.0 


 


Estimated GFR  > 60 


 


BUN/Creatinine Ratio  12.00 


 


Glucose  104 H 


 


POC Glucose  


 


Calcium  9.0 


 


Total Bilirubin  0.50 


 


AST  12 


 


ALT  12 


 


Alkaline Phosphatase  39 


 


Total Protein  6.6 


 


Albumin  3.9 


 


Albumin/Globulin Ratio  1.4 














Assessment and Plan


1 .BRBPR/ Hematochezia.


-Patient describes what is suspected as a diverticular bleed, now resolved.


-H/H stable


-Ok for diet


-Monitor overnight for bleeding. If patient bleeds, will need stat RBC scan vs 

CTA.  Consider pill cam as outpatient.


-WIll follow.

## 2017-08-11 NOTE — POST OPERATIVE NOTE
Pre-op diagnosis: gi bleed


Post-op diagnosis: same


Procedure: 


EGD: probable Lindsey's


- hiatal hernia


- mild gastritis (bx's)


- 8 mm cratered white based ulcer duodenum bulb (bx's)


- otherwise benign





Anesthesia: MAC


Surgeon: LAURITA BYRD


Estimated blood loss: none


Pathology: list


Specimen disposition: to lab


Condition: stable


Disposition: floor

## 2017-08-12 VITALS — SYSTOLIC BLOOD PRESSURE: 141 MMHG | DIASTOLIC BLOOD PRESSURE: 76 MMHG

## 2017-08-12 RX ADMIN — DEXTROSE AND SODIUM CHLORIDE SCH MLS/HR: 5; .9 INJECTION, SOLUTION INTRAVENOUS at 05:47

## 2017-08-12 RX ADMIN — NICOTINE SCH MG: 21 PATCH TRANSDERMAL at 09:46

## 2017-08-12 RX ADMIN — HYDRALAZINE HYDROCHLORIDE SCH MG: 25 TABLET, FILM COATED ORAL at 09:45

## 2017-08-12 NOTE — PROGRESS NOTE
Assessment and Plan


Stable cardiac status.  Follow-up at my office in 1-2 weeks.  An echocardiogram 

will be obtained at the office.  He may go home on hydralazine.








- Patient Problems


(1) Sinus bradycardia


Current Visit: Yes   Status: Chronic   





(2) Lower GI bleed


Current Visit: Yes   Status: Acute   





(3) Hypertension


Current Visit: Yes   Status: Chronic   


Qualifiers: 


   Hypertension type: essential hypertension   Qualified Code(s): I10 - 

Essential (primary) hypertension   





Subjective


Date of service: 08/12/17


Principal diagnosis: Asymptomatic sinus bradycardia


Interval history: 


No new complaints.  He remains in sinus bradycardia but stable.








Objective


 Vital Signs











  Temp Pulse Pulse Resp Resp Resp BP


 


 08/12/17 10:00    50 L    


 


 08/12/17 09:45   50 L      141/76


 


 08/12/17 08:58  98.3 F  50 L   18    141/76


 


 08/11/17 23:43   50 L     


 


 08/11/17 23:04   50 L      161/72


 


 08/11/17 22:00    50 L  20  20  20 














  Pulse Ox


 


 08/12/17 10:00 


 


 08/12/17 09:45 


 


 08/12/17 08:58  100


 


 08/11/17 23:43 


 


 08/11/17 23:04 


 


 08/11/17 22:00 














- Physical Examination


General: No Apparent Distress


HEENT: Positive: EOMI, Normocephaly, Mucus Membranes Moist


Neck: Positive: neck supple, trachea midline


Cardiac: Positive: Reg Rate and Rhythm, S1/S2


Lungs: Positive: clear to auscultation


Neuro: Positive: Grossly Intact


Abdomen: Positive: Soft, Active Bowel Sounds.  Negative: Tender


Skin: Positive: Clear.  Negative: Rash


Musculoskeletal: Normal Range of Motion


Extremities: Present: normal.  Absent: edema





- Labs and Meds


 CBC











  08/11/17 Range/Units





  15:47 


 


Hgb  13.2  (11.8-15.2)  gm/dl


 


Hct  39.7  (35.5-45.6)  %














- Imaging and Cardiology


EKG: image reviewed





- EKG


Sinus rhythms and dysrhythmias: sinus bradycardia

## 2017-08-12 NOTE — DISCHARGE SUMMARY
Providers





- Providers


Date of Admission: 


08/10/17 17:57





Date of discharge: 08/12/17


Attending physician: 


JEMAL LEMUS





 





08/10/17 21:12


Consult to Physician [CONS] Routine 


   Consulting Provider: OSCAR BARON


   Reason For Exam: GI Bleed


   Place consult to:: ANSWERING SERVICE


   Notified:: YES


   Phone number called:: [phone]


   If yes, spoke with:: UMAIR


   Time called:: 08:49


   Comment:: ROBERT





08/11/17 13:39


Consult to Physician [CONS] Routine 


   Consulting Provider: SONU CARBAJAL


   Reason For Exam: bradycardia


   Place consult to:: office


   Notified:: yes


   Phone number called:: [phone]


   Was contact made?: Yes


   If yes, spoke with:: umair


   Time called:: 16:25


   Comment:: robert











Primary care physician: 


MUSA MARTINS








Hospitalization


Reason for admission: hematochezia


Condition: Stable


Hospital course: 





Mr. Huizar is a 65 y/o male admitted with a one day hx of hematochezia with noted 

dark maroon stool as well.  He was seen by his PCP and sent to the ED.  H/H 

remains stable and the patient has had no further bleeding since admission.  CT 

of A/P negative.  He has a hx in 2015/2016 of suspected diverticular bleeding.  

He underwent EGD and colonoscopy in 2015 iwth normal EGD and fresh blood in the 

TI, suspected Diverticular bleeding.  The patient was recommended to have a 

pill cam at that time. He denies abdominal pain but reports right flank pain. 

No fever or chills. He was also found to have bradycardia on admission.  The 

patient was seen by cardiology and GI consultation.  Patient underwent EGD 

which revealed probable Lindsey's esophagus, hiatal hernia, mild gastritis, 8 

mm greater white-based ulcer and duodenal bulb.  H&H remained stable and 

patient could discharge with PPI prescription.  Cardiology evaluated the 

bradycardia with thyroid panel that was found to be normal.  Antihypertensive 

regimen was changed.  Clonidine was discontinued as this may be contributing to 

bradycardia and initiated hydralazine which may create reflex tachycardia to 

offset bradycardia.  Lisinopril was also discontinued.  Cardiology recommended 

pt to follow up with Dr. Carbajal within 1 week of hospital discharge ([phone]).  Dedicated discharge time 35 minutes.





Disposition: DC-01 TO HOME OR SELFCARE


Time spent for discharge: 35





- Discharge Diagnoses


(1) Gastritis and duodenitis


Status: Acute   





(2) Duodenal ulcer


Status: Acute   





(3) Nicotine dependence


Status: Acute   


Qualifiers: 


   Nicotine product type: N   Substance use status: S 





(4) Rectal bleeding


Status: Acute   





(5) Bradycardia


Status: Chronic   





(6) GERD (gastroesophageal reflux disease)


Status: Chronic   


Qualifiers: 


   Esophagitis presence: without esophagitis   Qualified Code(s): K21.9 - Gastro

-esophageal reflux disease without esophagitis   





(7) Hypertension


Status: Chronic   


Qualifiers: 


   Hypertension type: essential hypertension   Qualified Code(s): I10 - 

Essential (primary) hypertension   





Core Measure Documentation





- Palliative Care


Palliative Care/ Comfort Measures: Not Applicable





- Core Measures


Any of the following diagnoses?: none





Exam





- Constitutional


Vitals: 


 











Temp Pulse Resp BP Pulse Ox


 


 98.3 F   50 L  18   141/76   100 


 


 08/12/17 08:58  08/12/17 08:58  08/12/17 08:58  08/12/17 08:58  08/12/17 08:58











General appearance: Present: no acute distress, well-nourished





- EENT


Eyes: Present: PERRL


ENT: hearing intact, clear oral mucosa





- Neck


Neck: Present: supple, normal ROM





- Respiratory


Respiratory effort: normal


Respiratory: bilateral: CTA





- Cardiovascular


Heart Sounds: Present: S1 & S2.  Absent: rub, click





- Extremities


Extremities: pulses symmetrical, No edema


Peripheral Pulses: within normal limits





- Abdominal


General gastrointestinal: Present: soft, non-tender, non-distended, normal 

bowel sounds


Male genitourinary: Present: normal





- Integumentary


Integumentary: Present: clear, warm, dry





- Musculoskeletal


Musculoskeletal: gait normal, strength equal bilaterally





- Psychiatric


Psychiatric: appropriate mood/affect, intact judgment & insight





- Neurologic


Neurologic: CNII-XII intact, moves all extremities





Plan


Activity: no restrictions


Weight Bearing Status: Full Weight Bearing


Diet: regular


Follow up with: 


PRIMARY CARE,MD [Referring] - 3-5 Days


SONU CARBAJAL MD [Staff Physician] - 7 Days


LAURITA BYRD MD [Staff Physician] - 7 Days


Forms:  Accompanied Note


Prescriptions: 


hydrALAZINE [Apresoline TAB] 50 mg PO BID #60 tablet


Nicotine [Habitrol] 21 mg TD QDAY #30 patch


Pantoprazole [Protonix] 40 mg PO BID #60 tablet

## 2018-01-22 ENCOUNTER — HOSPITAL ENCOUNTER (INPATIENT)
Dept: HOSPITAL 5 - ED | Age: 68
LOS: 2 days | Discharge: HOME | DRG: 379 | End: 2018-01-24
Attending: INTERNAL MEDICINE | Admitting: INTERNAL MEDICINE
Payer: MEDICARE

## 2018-01-22 DIAGNOSIS — Z71.6: ICD-10-CM

## 2018-01-22 DIAGNOSIS — K21.9: ICD-10-CM

## 2018-01-22 DIAGNOSIS — K57.31: Primary | ICD-10-CM

## 2018-01-22 DIAGNOSIS — Z72.89: ICD-10-CM

## 2018-01-22 DIAGNOSIS — I10: ICD-10-CM

## 2018-01-22 DIAGNOSIS — F17.200: ICD-10-CM

## 2018-01-22 LAB
ALBUMIN SERPL-MCNC: 4.4 G/DL (ref 3.9–5)
ALT SERPL-CCNC: 19 UNITS/L (ref 7–56)
APTT BLD: 30.2 SEC. (ref 24.2–36.6)
BASOPHILS # (AUTO): 0 K/MM3 (ref 0–0.1)
BASOPHILS NFR BLD AUTO: 0.5 % (ref 0–1.8)
BUN SERPL-MCNC: 17 MG/DL (ref 9–20)
BUN/CREAT SERPL: 15 %
CALCIUM SERPL-MCNC: 8.8 MG/DL (ref 8.4–10.2)
EOSINOPHIL # BLD AUTO: 0.1 K/MM3 (ref 0–0.4)
EOSINOPHIL NFR BLD AUTO: 2.5 % (ref 0–4.3)
HCT VFR BLD CALC: 39.2 % (ref 35.5–45.6)
HEMOLYSIS INDEX: 16
HGB BLD-MCNC: 13 GM/DL (ref 11.8–15.2)
INR PPP: 0.94 (ref 0.87–1.13)
LIPASE SERPL-CCNC: 53 UNITS/L (ref 13–60)
LYMPHOCYTES # BLD AUTO: 2.4 K/MM3 (ref 1.2–5.4)
LYMPHOCYTES NFR BLD AUTO: 43 % (ref 13.4–35)
MCH RBC QN AUTO: 28 PG (ref 28–32)
MCHC RBC AUTO-ENTMCNC: 33 % (ref 32–34)
MCV RBC AUTO: 84 FL (ref 84–94)
MONOCYTES # (AUTO): 0.7 K/MM3 (ref 0–0.8)
MONOCYTES % (AUTO): 12.7 % (ref 0–7.3)
PLATELET # BLD: 321 K/MM3 (ref 140–440)
RBC # BLD AUTO: 4.67 M/MM3 (ref 3.65–5.03)

## 2018-01-22 PROCEDURE — 80053 COMPREHEN METABOLIC PANEL: CPT

## 2018-01-22 PROCEDURE — 86850 RBC ANTIBODY SCREEN: CPT

## 2018-01-22 PROCEDURE — 83690 ASSAY OF LIPASE: CPT

## 2018-01-22 PROCEDURE — 85018 HEMOGLOBIN: CPT

## 2018-01-22 PROCEDURE — 96375 TX/PRO/DX INJ NEW DRUG ADDON: CPT

## 2018-01-22 PROCEDURE — 80048 BASIC METABOLIC PNL TOTAL CA: CPT

## 2018-01-22 PROCEDURE — 96374 THER/PROPH/DIAG INJ IV PUSH: CPT

## 2018-01-22 PROCEDURE — 85014 HEMATOCRIT: CPT

## 2018-01-22 PROCEDURE — 85610 PROTHROMBIN TIME: CPT

## 2018-01-22 PROCEDURE — 85730 THROMBOPLASTIN TIME PARTIAL: CPT

## 2018-01-22 PROCEDURE — 85025 COMPLETE CBC W/AUTO DIFF WBC: CPT

## 2018-01-22 PROCEDURE — 99406 BEHAV CHNG SMOKING 3-10 MIN: CPT

## 2018-01-22 PROCEDURE — 36415 COLL VENOUS BLD VENIPUNCTURE: CPT

## 2018-01-22 PROCEDURE — 74177 CT ABD & PELVIS W/CONTRAST: CPT

## 2018-01-22 PROCEDURE — 93005 ELECTROCARDIOGRAM TRACING: CPT

## 2018-01-22 PROCEDURE — 86901 BLOOD TYPING SEROLOGIC RH(D): CPT

## 2018-01-22 PROCEDURE — 93010 ELECTROCARDIOGRAM REPORT: CPT

## 2018-01-22 PROCEDURE — C9113 INJ PANTOPRAZOLE SODIUM, VIA: HCPCS

## 2018-01-22 PROCEDURE — 86900 BLOOD TYPING SEROLOGIC ABO: CPT

## 2018-01-23 LAB
HCT VFR BLD CALC: 37.2 % (ref 35.5–45.6)
HGB BLD-MCNC: 12.2 GM/DL (ref 11.8–15.2)

## 2018-01-23 RX ADMIN — PANTOPRAZOLE SODIUM SCH MG: 40 INJECTION, POWDER, FOR SOLUTION INTRAVENOUS at 22:39

## 2018-01-23 RX ADMIN — PANTOPRAZOLE SODIUM SCH: 40 INJECTION, POWDER, FOR SOLUTION INTRAVENOUS at 11:00

## 2018-01-23 NOTE — HISTORY AND PHYSICAL REPORT
History of Present Illness


Date of examination: 01/23/18


Date of admission: 





1/23/18


Chief complaint: 


Rectal bleeding and abdominal cramping since this morning





History of present illness: 


Very pleasant 67-year-old -American male patient with significant past 

medical history of diverticulosis with rectal bleeding in the past 2-3 years 

ago and his EGD and colonoscopy was negative presented to the emergency room 

with history of rectal bleeding since this morning.


Patient denies nausea vomiting however complains of abdominal cramping and 

rectal bleeding


No hematemesis, patient also has history of gastroesophageal reflux disease and 

hypertension


Patient is hemodynamically stable, with stable H&H




















Past History


Past Medical History: GERD, hypertension, other (diverticulosis,GI bleeding)


Past Surgical History: No surgical history


Social history: smoking, alcohol abuse


Family history: hypertension





Medications and Allergies


 Allergies











Allergy/AdvReac Type Severity Reaction Status Date / Time


 


No Known Allergies Allergy   Verified 01/22/18 10:35











 Home Medications











 Medication  Instructions  Recorded  Confirmed  Last Taken  Type


 


Nicotine [Habitrol] 21 mg TD QDAY #30 patch 08/12/17 01/23/18 Unknown Rx


 


Pantoprazole [Protonix] 40 mg PO BID #60 tablet 08/12/17 01/23/18 Unknown Rx


 


hydrALAZINE [Apresoline TAB] 100 mg PO BID 01/23/18 01/23/18 Unknown History














Review of Systems


Constitutional: no weight loss, no weight gain


Ears, nose, mouth and throat: no nasal congestion, no nasal discharge


Cardiovascular: no chest pain, no shortness of breath


Respiratory: no cough, no cough with sputum


Gastrointestinal: abdominal pain, BRBPR, no nausea, no vomiting


Genitourinary Male: no dysuria, no hematuria


Musculoskeletal: no myalgias, no arthritis


Integumentary: no rash, no lesions


Neurological: no paralysis, no weakness, no seizures


Psychiatric: no anxiety, no depression


Endocrine: no cold intolerance, no heat intolerance


Hematologic/Lymphatic: no easy bruising, no easy bleeding


Allergic/Immunologic: no urticaria, no allergic rhinitis





Exam





- Constitutional


Vitals: 


 











Temp Pulse Resp BP Pulse Ox


 


 97.7 F   50 L  18   111/73   96 


 


 01/23/18 06:00  01/23/18 06:00  01/23/18 06:00  01/23/18 06:00  01/23/18 06:00











General appearance: Present: no acute distress, well-nourished, obese





- EENT


Eyes: Present: PERRL, EOM intact





- Neck


Neck: Present: supple, normal ROM





- Respiratory


Respiratory effort: normal


Respiratory: negative: rales, rhonchi, wheezing





- Cardiovascular


Rhythm: regular


Heart Sounds: Present: S1 & S2





- Extremities


Extremities: no ischemia, No edema





- Abdominal


General gastrointestinal: Present: soft, non-tender





- Integumentary


Integumentary: Present: clear, warm





- Musculoskeletal


Musculoskeletal: strength equal bilaterally





- Psychiatric


Psychiatric: appropriate mood/affect, cooperative





- Neurologic


Neurologic: CNII-XII intact, moves all extremities





Results





- Labs


CBC & Chem 7: 


 01/23/18 03:48





 01/22/18 10:55


Labs: 


 Abnormal lab results











  01/22/18 Range/Units





  10:55 


 


Lymph % (Auto)  43.0 H  (13.4-35.0)  %


 


Mono % (Auto)  12.7 H  (0.0-7.3)  %














Assessment and Plan


--Lower GI bleeding rectal bleeding;


Probably secondary to diverticulosis, closely monitor H&H and transfuse as 

needed, GI evaluation


For possible endoscopy





--History of diverticulosis; probably the cause of rectal bleeding, closely 

monitor





--Gastric esophageal reflux disease; IV Protonix supportive care





--Hypertension; moderate control, closely monitor blood pressures and adjust 

the medications as needed





--DVT prophylaxis; no pharmacologic anticoagulation in view of GI bleeding, use 

SCDs





--Ongoing tobacco use; smoking cessation counseling done advised nicotine patch 

as needed





--History of alcohol use; counseling done advised to quit alcohol intake, 

closely monitor for any withdrawal symptoms





--Full CODE STATUS


Admit the patient to the hospital, follow GI evaluation, possible endoscopy if 

needed





Plan of care discussed with the patient and his nurse

## 2018-01-23 NOTE — GASTROENTEROLOGY CONSULTATION
<ROSELIAROBYANET STEPHENS - Last Filed: 01/23/18 10:38>





History of Present Illness





- Reason for Consult


Consult date: 01/23/18


GI bleed


Requesting physician: AMY J KOCHERLA





- History of Present Illness


Patient is a 68 y/o male with PMH of HTN and GERD who presented to ED with c/o 

rectal bleeding and lower abdominal cramping. Abd CT was negative. He is 

previously known to our service. He was first seen by our service in 2015 for 

hematochezia and underwent an EGD/colonoscopy. EGD was normal and colonoscopy 

showed diverticulosis. He was also seen 05/2016 and most recently in 08/2017 

for GI bleed with hematochezia that was most likely diverticular in nature with 

H/H stable that resolved within 24 hours. This morning pt was resting on 

stretcher w/o acute distress. He reports BMs x 3 yesterday with bright red 

blood in toilet and on TP. No signs of active bleeding this am. No hematemesis 

or melena. Admits to mild lower abd discomfort described as cramping that is 

intermittent. Denies fever, wt loss, CP, SOB, dizziness, N/V, diarrhea, or 

constipation. No NSAID use. On daily PPI. No Fhx of GI cancers.

















Past History


Past Medical History: GERD, hypertension, other (diverticulosis with hemorrage)


Past Surgical History: No surgical history


Social history: smoking, other (alcohol with 6 pack a week)





Medications and Allergies


 Allergies











Allergy/AdvReac Type Severity Reaction Status Date / Time


 


No Known Allergies Allergy   Verified 01/22/18 10:35











 Home Medications











 Medication  Instructions  Recorded  Confirmed  Last Taken  Type


 


Nicotine [Habitrol] 21 mg TD QDAY #30 patch 08/12/17 01/23/18 Unknown Rx


 


Pantoprazole [Protonix] 40 mg PO BID #60 tablet 08/12/17 01/23/18 Unknown Rx


 


hydrALAZINE [Apresoline TAB] 100 mg PO BID 01/23/18 01/23/18 Unknown History











Active Meds: 


Active Medications





Hydralazine HCl (Apresoline)  10 mg IV Q4H PRN


   PRN Reason: Hypertension


Pantoprazole Sodium (Protonix)  40 mg IV BID CHRISTINE











Review of Systems





- Review of Systems


All systems: negative


Gastrointestinal: abdominal pain (lower abd cramping), hematochezia





Exam





- Constitutional


Vital Signs: 


 











Temp Pulse Resp BP Pulse Ox


 


 97.7 F   51 L  16   118/78   96 


 


 01/23/18 06:00  01/23/18 08:20  01/23/18 08:20  01/23/18 08:20  01/23/18 08:20











General appearance: no acute distress, well-nourished





- Respiratory


Respiratory: bilateral: diminished (anterior)





- Cardiovascular


Rhythm: other (bradycardia)


Heart Sounds: Present: S1 & S2





- Gastrointestinal


General gastrointestinal: Present: soft, non-tender, non-distended, normal 

bowel sounds


Rectal Exam: stool bloody (bright red blood)





- Integumentary


Integumentary: Present: warm, dry





- Neurologic


Neurological: alert and oriented x3





- Labs


CBC & Chem 7: 


 01/23/18 03:48





 01/22/18 10:55


Lab Results: 


 Laboratory Results - last 24 hr











  01/22/18 01/22/18 01/22/18





  10:55 10:55 10:55


 


WBC  5.5  


 


RBC  4.67  


 


Hgb  13.0  


 


Hct  39.2  


 


MCV  84  


 


MCH  28  


 


MCHC  33  


 


RDW  15.0  


 


Plt Count  321  


 


Lymph % (Auto)  43.0 H  


 


Mono % (Auto)  12.7 H  


 


Eos % (Auto)  2.5  


 


Baso % (Auto)  0.5  


 


Lymph #  2.4  


 


Mono #  0.7  


 


Eos #  0.1  


 


Baso #  0.0  


 


Seg Neutrophils %  41.3  


 


Seg Neutrophils #  2.3  


 


PT   13.0 


 


INR   0.94 


 


APTT   30.2 


 


Sodium    142


 


Potassium    4.2


 


Chloride    105.4


 


Carbon Dioxide    23


 


Anion Gap    18


 


BUN    17


 


Creatinine    1.1


 


Estimated GFR    > 60


 


BUN/Creatinine Ratio    15


 


Glucose    82


 


Calcium    8.8


 


Total Bilirubin    0.30


 


AST    19


 


ALT    19


 


Alkaline Phosphatase    53


 


Total Protein    7.2


 


Albumin    4.4


 


Albumin/Globulin Ratio    1.6


 


Lipase    53


 


Blood Type   


 


Antibody Screen   














  01/22/18 01/23/18





  10:55 03:48


 


WBC  


 


RBC  


 


Hgb   12.2


 


Hct   37.2


 


MCV  


 


MCH  


 


MCHC  


 


RDW  


 


Plt Count  


 


Lymph % (Auto)  


 


Mono % (Auto)  


 


Eos % (Auto)  


 


Baso % (Auto)  


 


Lymph #  


 


Mono #  


 


Eos #  


 


Baso #  


 


Seg Neutrophils %  


 


Seg Neutrophils #  


 


PT  


 


INR  


 


APTT  


 


Sodium  


 


Potassium  


 


Chloride  


 


Carbon Dioxide  


 


Anion Gap  


 


BUN  


 


Creatinine  


 


Estimated GFR  


 


BUN/Creatinine Ratio  


 


Glucose  


 


Calcium  


 


Total Bilirubin  


 


AST  


 


ALT  


 


Alkaline Phosphatase  


 


Total Protein  


 


Albumin  


 


Albumin/Globulin Ratio  


 


Lipase  


 


Blood Type  O POSITIVE 


 


Antibody Screen  Negative 














Assessment and Plan


1.GI bleed


 2.hematochezia


 3.abd cramping





-Abd CT negative


-HGB 12.2-stable


-continue to monitor H/H and transfuse as needed


-no active signs of bleeding this am- currently hemodynamically stable


-hold blood thinning medications


-EGD 2015 was normal


-colonoscopy 2015 showed blood in TI, diverticulosis, and polyps 


-etiology- most likely diverticular bleed


-will schedule for colonoscopy in am


-clear liquids today then NPO after MN


-continue PPI and supportive care


-will follow








<AISHA SANCHEZ - Last Filed: 01/23/18 16:34>





Medications and Allergies


Active Meds: 


Active Medications





Bisacodyl (Dulcolax)  15 mg PO ONCE NR


   Stop: 01/23/18 17:00


Hydralazine HCl (Apresoline)  10 mg IV Q4H PRN


   PRN Reason: Hypertension


Sodium Chloride (Nacl 0.9% 1000 Ml)  1,000 mls @ 75 mls/hr IV AS DIRECT CHRISTINE


Nicotine (Habitrol)  14 mg TD QDAY CHRISTINE


Pantoprazole Sodium (Protonix)  40 mg IV BID CHRISTINE


   Last Admin: 01/23/18 11:00 Dose:  Not Given


Polyethylene Glycol/Electrolytes (Golytely)  4,000 ml PO ONCE NR


   Stop: 01/24/18 02:00











Exam





- Constitutional


Vital Signs: 


 











Temp Pulse Resp BP Pulse Ox


 


 97.7 F   54 L  16   172/76   95 


 


 01/23/18 06:00  01/23/18 14:11  01/23/18 14:11  01/23/18 14:11  01/23/18 14:11














- Labs


CBC & Chem 7: 


 01/23/18 03:48





 01/22/18 10:55


Lab Results: 


 Laboratory Results - last 24 hr











  01/23/18





  03:48


 


Hgb  12.2


 


Hct  37.2














Assessment and Plan


Patient seen and examined.  Agree with note by Rob Oliveira.  Will plan for 

colonoscopy tomorrow, rest as above.

## 2018-01-23 NOTE — CAT SCAN REPORT
FINAL REPORT



EXAM:  CT ABDOMEN PELVIS W CON



HISTORY:  Abd pain 



TECHNIQUE:  CT images are acquired through the Abdomen and Pelvis

in arterial and delayed phases following intravenous

administration of contrast. Transaxial, coronal and sagittal

reformations are provided. 



PRIORS:  09/28/2015



FINDINGS:  

Partially visualized intrathoracic contents are unremarkable.



The liver, gallbladder, pancreas, spleen, and adrenal glands are

unremarkable. 



Kidneys show no worrisome lesions, hydronephrosis, or calculi.

Urinary bladder is unremarkable. 



Small and large bowel are normal in caliber. Appendix is normal.

No free air, free fluid, or lymphadenopathy identified.



Aorta is normal in course and caliber.



Superficial soft tissues are unremarkable.  No acute or

aggressive appearing skeletal findings.



IMPRESSION:  

No acute findings in the abdomen or pelvis.

## 2018-01-23 NOTE — EMERGENCY DEPARTMENT REPORT
HPI





- General


Chief Complaint: GI Bleed


Time Seen by Provider: 01/23/18 03:07





- HPI


HPI: 


This is a 67-year-old -American male presents to the emergency 

department with complaint of some rectal bleeding with bowel movements since 

this morning.  He denies any rectal pain but complains of some lower abdominal 

cramping sensation.  He denies any nausea, vomiting, fever.  He has a history 

of diverticulosis and previous rectal bleeding in the past and had an admission 

for similar and 2015, 2016 and 2017.  His primary care physician is Dr. Hilario but he has not seemed to follow-up with a gastroenterologist after any 

of these admissions.  He also has a past medical history of GERD, hypertension.

  He did not take anything for her symptoms prior to presentation.  No recent 

travel or sick contacts at home.








ED Past Medical Hx





- Past Medical History


Hx Hypertension: Yes


Hx Congestive Heart Failure: No


Hx GERD: Yes


Hx Asthma: No


Hx COPD: No


Additional medical history: DIVERTICULOSIS OF LARGE INTESTINE WITH HEMORRHAGE





- Surgical History


Past Surgical History?: No





- Social History


Smoking Status: Current Every Day Smoker


Substance Use Type: Alcohol





- Medications


Home Medications: 


 Home Medications











 Medication  Instructions  Recorded  Confirmed  Last Taken  Type


 


Nicotine [Habitrol] 21 mg TD QDAY #30 patch 08/12/17 01/23/18 Unknown Rx


 


Pantoprazole [Protonix] 40 mg PO BID #60 tablet 08/12/17 01/23/18 Unknown Rx


 


hydrALAZINE [Apresoline TAB] 100 mg PO BID 01/23/18 01/23/18 Unknown History














ED Review of Systems


ROS: 


Stated complaint: GI BLEED


Other details as noted in HPI





Comment: All other systems reviewed and negative


Constitutional: denies: chills, fever


Eyes: denies: eye pain, eye discharge, vision change


ENT: denies: ear pain, throat pain


Respiratory: denies: cough, shortness of breath, wheezing


Cardiovascular: denies: chest pain, palpitations


Gastrointestinal: abdominal pain, other (rectal bleeding).  denies: vomiting


Genitourinary: denies: urgency, dysuria


Musculoskeletal: denies: back pain, joint swelling, arthralgia


Skin: denies: rash, lesions


Neurological: denies: headache, weakness, paresthesias





Physical Exam





- Physical Exam


Vital Signs: 


 Vital Signs











  01/22/18 01/23/18 01/23/18





  10:35 02:06 03:26


 


Temperature 97.8 F 97.4 F L 97.7 F


 


Pulse Rate 64 55 L 47 L


 


Respiratory 18 18 20





Rate   


 


Blood Pressure 129/63 134/78 


 


Blood Pressure   151/74





[Left]   


 


O2 Sat by Pulse 100 97 98





Oximetry   














  01/23/18 01/23/18





  03:27 04:20


 


Temperature  


 


Pulse Rate  


 


Respiratory 20 20





Rate  


 


Blood Pressure  


 


Blood Pressure  





[Left]  


 


O2 Sat by Pulse 98 





Oximetry  











Physical Exam: 


GENERAL: The patient is well-developed well-nourished.


HENT: Normocephalic.  Atraumatic.    Patient has moist mucous membranes.


EYES: Extraocular motions are intact.  Pupils equal reactive to light 

bilaterally.


NECK: Supple.  Trachea is midline.


CHEST/LUNGS: Clear to auscultation.  There is no respiratory distress noted.


HEART/CARDIOVASCULAR: Regular.  There is no tachycardia.  There is no murmur.


ABDOMEN: Abdomen is soft.  No abdominal tenderness to palpation.  Patient has 

normal bowel sounds.  There is no abdominal distention.


RECTAL: There is some gross maroon blood seen with digital rectal examination 

that is also positive on guaiac testing.


SKIN: Skin is warm and dry.


NEURO: The patient is awake, alert, and oriented.  The patient is cooperative.  

The patient has no focal neurologic deficits.  The patient has normal speech.


MUSCULOSKELETAL: There is no tenderness or deformity.  There is no limitation 

range of motion.  There is no evidence of acute injury.








ED Course


 Vital Signs











  01/22/18 01/23/18 01/23/18





  10:35 02:06 03:26


 


Temperature 97.8 F 97.4 F L 97.7 F


 


Pulse Rate 64 55 L 47 L


 


Respiratory 18 18 20





Rate   


 


Blood Pressure 129/63 134/78 


 


Blood Pressure   151/74





[Left]   


 


O2 Sat by Pulse 100 97 98





Oximetry   














  01/23/18 01/23/18





  03:27 04:20


 


Temperature  


 


Pulse Rate  


 


Respiratory 20 20





Rate  


 


Blood Pressure  


 


Blood Pressure  





[Left]  


 


O2 Sat by Pulse 98 





Oximetry  














- Consultations


Consultation #1: 


I spoke with the gastroenterologist on-call, Dr. Schulz, who listened to the 

patient's case presentation and ED course and recommends admitting to the 

hospital where they will see him as a consult.


01/23/18 06:01








ED Medical Decision Making





- Lab Data


Result diagrams: 


 01/23/18 03:48





 01/22/18 10:55





- Radiology Data


Radiology results: report reviewed





FINAL REPORT 





EXAM: CT ABDOMEN PELVIS W CON 





HISTORY: Abd pain 





TECHNIQUE: CT images are acquired through the Abdomen and Pelvis 


in arterial and delayed phases following intravenous 


administration of contrast. Transaxial, coronal and sagittal 


reformations are provided. 





PRIORS: 09/28/2015 





FINDINGS: 


Partially visualized intrathoracic contents are unremarkable. 





The liver, gallbladder, pancreas, spleen, and adrenal glands are 


unremarkable. 





Kidneys show no worrisome lesions, hydronephrosis, or calculi. 


Urinary bladder is unremarkable. 





Small and large bowel are normal in caliber. Appendix is normal. 


No free air, free fluid, or lymphadenopathy identified. 





Aorta is normal in course and caliber. 





Superficial soft tissues are unremarkable. No acute or 


aggressive appearing skeletal findings. 





IMPRESSION: 


No acute findings in the abdomen or pelvis. 











Transcribed By: MB 


Dictated By: HORTENSIA HUITRON MD 


Electronically Authenticated By: HORTENSIA HUITRON MD 


Signed Date/Time: 01/23/18 0110





- Medical Decision Making


Patient presents with rectal bleeding with bowel movements for the past 24 

hours for a total of 3 episodes.  He has been in the emergency department for 

about 20 hours and his hemoglobin has dropped slightly from 13 down to 12.2.  

The rest of his labs are mostly unremarkable.  He does have some gross blood on 

examination.  CT of the abdomen and pelvis does not show any acute process.  I 

spoke to gastroenterology who recommends admission and they will see him as a 

consult.  The overnight hospitalist is aware but may give this admission to the 

AM hospitalist.








- Differential Diagnosis


diverticulosis, hemorrhoids, malignancy


Critical Care Time: No


Critical care attestation.: 


If time is entered above; I have spent that time in minutes in the direct care 

of this critically ill patient, excluding procedure time.








ED Disposition


Clinical Impression: 


 Rectal bleeding, Lower GI bleed, Bradycardia





Disposition: DC-09 OP ADMIT IP TO THIS HOSP


Is pt being admited?: Yes


Does the pt Need Aspirin: No


Condition: Stable


Referrals: 


HERBERT HILARIO MD [Primary Care Provider] - 3-5 Days


Forms:  Accompanied Note


Time of Disposition: 06:03

## 2018-01-24 VITALS — SYSTOLIC BLOOD PRESSURE: 144 MMHG | DIASTOLIC BLOOD PRESSURE: 63 MMHG

## 2018-01-24 LAB
BASOPHILS # (AUTO): 0 K/MM3 (ref 0–0.1)
BASOPHILS NFR BLD AUTO: 0.5 % (ref 0–1.8)
BUN SERPL-MCNC: 15 MG/DL (ref 9–20)
BUN/CREAT SERPL: 15 %
CALCIUM SERPL-MCNC: 8.1 MG/DL (ref 8.4–10.2)
EOSINOPHIL # BLD AUTO: 0.3 K/MM3 (ref 0–0.4)
EOSINOPHIL NFR BLD AUTO: 5.3 % (ref 0–4.3)
HCT VFR BLD CALC: 34.4 % (ref 35.5–45.6)
HEMOLYSIS INDEX: 1
HGB BLD-MCNC: 11.2 GM/DL (ref 11.8–15.2)
LYMPHOCYTES # BLD AUTO: 2.5 K/MM3 (ref 1.2–5.4)
LYMPHOCYTES NFR BLD AUTO: 45.9 % (ref 13.4–35)
MCH RBC QN AUTO: 27 PG (ref 28–32)
MCHC RBC AUTO-ENTMCNC: 33 % (ref 32–34)
MCV RBC AUTO: 84 FL (ref 84–94)
MONOCYTES # (AUTO): 0.5 K/MM3 (ref 0–0.8)
MONOCYTES % (AUTO): 8.9 % (ref 0–7.3)
PLATELET # BLD: 311 K/MM3 (ref 140–440)
RBC # BLD AUTO: 4.12 M/MM3 (ref 3.65–5.03)

## 2018-01-24 RX ADMIN — PANTOPRAZOLE SODIUM SCH MG: 40 INJECTION, POWDER, FOR SOLUTION INTRAVENOUS at 10:51

## 2018-01-24 NOTE — DISCHARGE SUMMARY
Providers





- Providers


Date of Admission: 


01/23/18 06:58





Date of discharge: 01/24/18


Attending physician: 


AMY J KOCHERLA





 





01/23/18 07:22


Consult to Physician [CONS] Routine 


   Consulting Provider: LAURITA BYRD


   Reason For Exam: GI bleeding


   Place consult to:: Grecia Oliveira


   Notified:: yes


   Phone number called:: overhead page


   Was contact made?: Yes


   If yes, spoke with:: Grecia


   Time called:: 08:52











Primary care physician: 


HERBERT HILARIO








Hospitalization


Condition: Stable


Disposition: DC-01 TO HOME OR SELFCARE





Core Measure Documentation





- Palliative Care


Palliative Care/ Comfort Measures: Not Applicable





- Core Measures


Any of the following diagnoses?: none





Exam





- Constitutional


Vitals: 


 











Temp Pulse Resp BP Pulse Ox


 


 97.9 F   55 L  18   144/63   94 


 


 01/24/18 11:15  01/24/18 11:15  01/24/18 11:15  01/24/18 11:15  01/24/18 11:15











General appearance: Present: no acute distress, well-nourished





- EENT


Eyes: Present: PERRL, EOM intact





- Neck


Neck: Present: supple, normal ROM





- Respiratory


Respiratory effort: normal


Respiratory: negative: rales, rhonchi, wheezing





- Cardiovascular


Rhythm: regular


Heart Sounds: Present: S1 & S2





- Extremities


Extremities: no ischemia, No edema





- Abdominal


General gastrointestinal: Present: soft, non-tender, non-distended, normal 

bowel sounds





- Integumentary


Integumentary: Present: clear, warm





- Musculoskeletal


Musculoskeletal: strength equal bilaterally





- Psychiatric


Psychiatric: appropriate mood/affect, cooperative





- Neurologic


Neurologic: CNII-XII intact, moves all extremities





Plan


Activity: no restrictions


Diet: low salt


Additional Instructions: If you notice any bleeding, contact M.D. or go to 

emergency room


Follow up with: 


HERBERT HILARIO MD [Primary Care Provider] - 3-5 Days


AISHA SANCHEZ MD [Staff Physician] - 7 Days


Forms:  Accompanied Note

## 2018-01-24 NOTE — GASTROENTEROLOGY PROGRESS NOTE
Assessment and Plan


1.GI bleed


 2.hematochezia


 3.abd cramping





H/H stable with no signs of active bleeding overnight or this am. Etiology most 

like 2/2 diverticular bleed. Recommend pt have a colonoscopy, however he did 

not complete prep overnight and is currently refusing to take another prep. He 

states he wants to eat and wishes to go home with the option to do colonoscopy 

as an outpatient. Recommend pt f/u in clinic in 1-2weeks to schedule outpatient 

colonoscopy. Okay for regular diet. Will sign off, please re-consult if needed.




















Subjective


Date of service: 01/24/18


Principal diagnosis: rectal bleeding


Interval history: 


Patient resting in bed w/o acute distress. He did not complete colon prep 

overnight with only half of it consumed. Solid stools noted per nursing. He 

denies any active signs of bleeding overnight or this am and is w/o GI 

complaints such as abd pain or N/V. Discussed with pt that since he did not 

complete his colon prep that colonoscopy for today would have to be cancelled 

and rescheduled for tomorrow with him repeating colon prep today. Pt is 

refusing to take prep today. He states he wants to eat and go home with option 

to have colonoscopy done as an outpatient.








Objective





- Constitutional


Vitals: 


 











Temp Pulse Resp BP Pulse Ox


 


 98.1 F   46 L  20   168/78   98 


 


 01/24/18 07:56  01/24/18 07:56  01/24/18 07:56  01/24/18 07:56  01/24/18 07:56











General appearance: no acute distress





- Respiratory


Respiratory: bilateral: CTA





- Cardiovascular


Rhythm: regular


Heart Sounds: Present: S1 & S2





- Gastrointestinal


General gastrointestinal: Present: soft, non-tender, non-distended, normal 

bowel sounds





- Neurologic


Neurological: alert and oriented x3





- Labs


CBC & Chem 7: 


 01/24/18 06:50





 01/24/18 06:50


Labs: 


 Laboratory Results - last 24 hr











  01/24/18 01/24/18





  06:50 06:50


 


WBC  5.5 


 


RBC  4.12 


 


Hgb  11.2 L 


 


Hct  34.4 L 


 


MCV  84 


 


MCH  27 L 


 


MCHC  33 


 


RDW  14.5 


 


Plt Count  311 


 


Lymph % (Auto)  45.9 H 


 


Mono % (Auto)  8.9 H 


 


Eos % (Auto)  5.3 H 


 


Baso % (Auto)  0.5 


 


Lymph #  2.5 


 


Mono #  0.5 


 


Eos #  0.3 


 


Baso #  0.0 


 


Seg Neutrophils %  39.4 L 


 


Seg Neutrophils #  2.2 


 


Sodium   145


 


Potassium   3.9


 


Chloride   108.4 H


 


Carbon Dioxide   22


 


Anion Gap   19


 


BUN   15


 


Creatinine   1.0


 


Estimated GFR   > 60


 


BUN/Creatinine Ratio   15


 


Glucose   92


 


Calcium   8.1 L

## 2018-01-25 NOTE — ANESTHESIA CONSULTATION
Anesthesia Consult and Med Hx





- Pre-Operative Health Status


ASA Pre-Surgery Classification: ASA3


Proposed Anesthetic Plan: MAC





- Pulmonary


Hx Smoking: Yes


Hx Asthma: No


COPD: No


Hx Pneumonia: No





- Cardiovascular System


Hx Hypertension: Yes





- Endocrine


Hx End Stage Renal Disease: No





- Other Systems


Hx Alcohol Use: Yes

## 2024-06-09 ENCOUNTER — CLAIMS CREATED FROM THE CLAIM WINDOW (OUTPATIENT)
Dept: URBAN - METROPOLITAN AREA MEDICAL CENTER 16 | Facility: MEDICAL CENTER | Age: 74
End: 2024-06-09
Payer: MEDICARE

## 2024-06-09 DIAGNOSIS — D62 ABLA (ACUTE BLOOD LOSS ANEMIA): ICD-10-CM

## 2024-06-09 DIAGNOSIS — K92.1 ACUTE MELENA: ICD-10-CM

## 2024-06-09 PROCEDURE — G8427 DOCREV CUR MEDS BY ELIG CLIN: HCPCS | Performed by: INTERNAL MEDICINE

## 2024-06-09 PROCEDURE — 99222 1ST HOSP IP/OBS MODERATE 55: CPT | Performed by: INTERNAL MEDICINE

## 2024-06-09 PROCEDURE — 99254 IP/OBS CNSLTJ NEW/EST MOD 60: CPT | Performed by: INTERNAL MEDICINE

## 2024-06-10 ENCOUNTER — CLAIMS CREATED FROM THE CLAIM WINDOW (OUTPATIENT)
Dept: URBAN - METROPOLITAN AREA MEDICAL CENTER 16 | Facility: MEDICAL CENTER | Age: 74
End: 2024-06-10
Payer: MEDICARE

## 2024-06-10 DIAGNOSIS — D62 ABLA (ACUTE BLOOD LOSS ANEMIA): ICD-10-CM

## 2024-06-10 DIAGNOSIS — K92.1 ACUTE MELENA: ICD-10-CM

## 2024-06-10 PROCEDURE — 99232 SBSQ HOSP IP/OBS MODERATE 35: CPT | Performed by: INTERNAL MEDICINE

## 2024-06-11 ENCOUNTER — CLAIMS CREATED FROM THE CLAIM WINDOW (OUTPATIENT)
Dept: URBAN - METROPOLITAN AREA MEDICAL CENTER 16 | Facility: MEDICAL CENTER | Age: 74
End: 2024-06-11
Payer: MEDICARE

## 2024-06-11 DIAGNOSIS — K31.89 OTHER DISEASES OF STOMACH AND DUODENUM: ICD-10-CM

## 2024-06-11 DIAGNOSIS — D62 ABLA (ACUTE BLOOD LOSS ANEMIA): ICD-10-CM

## 2024-06-11 DIAGNOSIS — K25.9 ANTRAL ULCER: ICD-10-CM

## 2024-06-11 DIAGNOSIS — K92.1 ACUTE MELENA: ICD-10-CM

## 2024-06-11 PROCEDURE — 43239 EGD BIOPSY SINGLE/MULTIPLE: CPT | Performed by: INTERNAL MEDICINE

## 2024-06-11 PROCEDURE — 45380 COLONOSCOPY AND BIOPSY: CPT | Performed by: INTERNAL MEDICINE

## 2024-06-11 PROCEDURE — 45378 DIAGNOSTIC COLONOSCOPY: CPT | Performed by: INTERNAL MEDICINE

## 2024-06-11 PROCEDURE — 43235 EGD DIAGNOSTIC BRUSH WASH: CPT | Performed by: INTERNAL MEDICINE

## 2024-06-12 ENCOUNTER — CLAIMS CREATED FROM THE CLAIM WINDOW (OUTPATIENT)
Dept: URBAN - METROPOLITAN AREA MEDICAL CENTER 16 | Facility: MEDICAL CENTER | Age: 74
End: 2024-06-12
Payer: MEDICARE

## 2024-06-12 DIAGNOSIS — D62 ABLA (ACUTE BLOOD LOSS ANEMIA): ICD-10-CM

## 2024-06-12 DIAGNOSIS — K25.9 ANTRAL ULCER: ICD-10-CM

## 2024-06-12 DIAGNOSIS — K92.1 HEMATOCHEZIA: ICD-10-CM

## 2024-06-12 PROCEDURE — 99232 SBSQ HOSP IP/OBS MODERATE 35: CPT

## 2024-06-13 ENCOUNTER — CLAIMS CREATED FROM THE CLAIM WINDOW (OUTPATIENT)
Dept: URBAN - METROPOLITAN AREA MEDICAL CENTER 16 | Facility: MEDICAL CENTER | Age: 74
End: 2024-06-13
Payer: MEDICARE

## 2024-06-13 DIAGNOSIS — K92.1 HEMATOCHEZIA: ICD-10-CM

## 2024-06-13 DIAGNOSIS — D62 ACUTE POSTHEMORRHAGIC ANEMIA: ICD-10-CM

## 2024-06-13 PROCEDURE — 99232 SBSQ HOSP IP/OBS MODERATE 35: CPT

## 2024-06-18 ENCOUNTER — TELEPHONE ENCOUNTER (OUTPATIENT)
Dept: URBAN - METROPOLITAN AREA CLINIC 6 | Facility: CLINIC | Age: 74
End: 2024-06-18